# Patient Record
Sex: MALE | Race: WHITE | NOT HISPANIC OR LATINO | Employment: OTHER | URBAN - METROPOLITAN AREA
[De-identification: names, ages, dates, MRNs, and addresses within clinical notes are randomized per-mention and may not be internally consistent; named-entity substitution may affect disease eponyms.]

---

## 2024-04-23 ENCOUNTER — MEDICAL CORRESPONDENCE (OUTPATIENT)
Dept: HEALTH INFORMATION MANAGEMENT | Facility: CLINIC | Age: 79
End: 2024-04-23
Payer: MEDICARE

## 2024-04-25 ENCOUNTER — TRANSCRIBE ORDERS (OUTPATIENT)
Dept: OTHER | Age: 79
End: 2024-04-25

## 2024-04-25 DIAGNOSIS — G62.9 POLYNEUROPATHY: ICD-10-CM

## 2024-04-25 DIAGNOSIS — R29.898 BILATERAL ARM WEAKNESS: Primary | ICD-10-CM

## 2024-04-29 ENCOUNTER — TELEPHONE (OUTPATIENT)
Dept: NEUROLOGY | Facility: CLINIC | Age: 79
End: 2024-04-29
Payer: MEDICARE

## 2024-04-29 NOTE — TELEPHONE ENCOUNTER
Left Voicemail (1st Attempt) for the patient to call back and schedule the following:    Appointment type: New Neuromuscular  Provider: Dr. Harley, Dr. Evangelista, Dr. Solano  Return date: next avail, any location  Specialty phone number: [phone]  Additional appointment(s) needed:   Additonal Notes:

## 2024-06-12 ENCOUNTER — OFFICE VISIT (OUTPATIENT)
Dept: NEUROLOGY | Facility: CLINIC | Age: 79
End: 2024-06-12
Attending: FAMILY MEDICINE
Payer: MEDICARE

## 2024-06-12 VITALS — DIASTOLIC BLOOD PRESSURE: 81 MMHG | HEART RATE: 79 BPM | OXYGEN SATURATION: 94 % | SYSTOLIC BLOOD PRESSURE: 140 MMHG

## 2024-06-12 DIAGNOSIS — R29.898 BILATERAL ARM WEAKNESS: ICD-10-CM

## 2024-06-12 DIAGNOSIS — G62.9 POLYNEUROPATHY: ICD-10-CM

## 2024-06-12 DIAGNOSIS — M54.12 CERVICAL RADICULOPATHY: ICD-10-CM

## 2024-06-12 DIAGNOSIS — Z11.4 ENCOUNTER FOR SCREENING FOR HUMAN IMMUNODEFICIENCY VIRUS (HIV): ICD-10-CM

## 2024-06-12 DIAGNOSIS — E11.42 DIABETIC POLYNEUROPATHY ASSOCIATED WITH TYPE 2 DIABETES MELLITUS (H): ICD-10-CM

## 2024-06-12 DIAGNOSIS — R74.8 ABNORMAL LEVELS OF OTHER SERUM ENZYMES: ICD-10-CM

## 2024-06-12 DIAGNOSIS — R25.3 FASCICULATIONS: ICD-10-CM

## 2024-06-12 DIAGNOSIS — M47.812 CERVICAL SPONDYLOSIS WITHOUT MYELOPATHY: Primary | ICD-10-CM

## 2024-06-12 DIAGNOSIS — R63.39 OTHER FEEDING DIFFICULTIES: ICD-10-CM

## 2024-06-12 PROBLEM — L82.1 SK (SEBORRHEIC KERATOSIS): Status: ACTIVE | Noted: 2017-06-26

## 2024-06-12 PROBLEM — N40.1 BENIGN PROSTATIC HYPERPLASIA WITH INCOMPLETE BLADDER EMPTYING: Status: ACTIVE | Noted: 2020-01-21

## 2024-06-12 PROBLEM — F41.1 ANXIETY STATE: Status: ACTIVE | Noted: 2024-06-12

## 2024-06-12 PROBLEM — H25.9 AGE-RELATED CATARACT OF LEFT EYE: Status: ACTIVE | Noted: 2019-03-05

## 2024-06-12 PROBLEM — Z78.9 STATIN INTOLERANCE: Status: ACTIVE | Noted: 2020-01-21

## 2024-06-12 PROBLEM — N52.9 ED (ERECTILE DYSFUNCTION): Status: ACTIVE | Noted: 2024-06-12

## 2024-06-12 PROBLEM — M48.062 NEUROGENIC CLAUDICATION DUE TO LUMBAR SPINAL STENOSIS: Status: ACTIVE | Noted: 2019-06-17

## 2024-06-12 PROBLEM — M47.26 OSTEOARTHRITIS OF SPINE WITH RADICULOPATHY, LUMBAR REGION: Status: ACTIVE | Noted: 2019-05-15

## 2024-06-12 PROBLEM — N20.0 KIDNEY STONE ON LEFT SIDE: Status: ACTIVE | Noted: 2019-03-05

## 2024-06-12 PROBLEM — N18.32 STAGE 3B CHRONIC KIDNEY DISEASE (H): Status: ACTIVE | Noted: 2019-05-15

## 2024-06-12 PROBLEM — R39.14 BENIGN PROSTATIC HYPERPLASIA WITH INCOMPLETE BLADDER EMPTYING: Status: ACTIVE | Noted: 2020-01-21

## 2024-06-12 PROBLEM — M47.816 SPONDYLOSIS OF LUMBAR REGION WITHOUT MYELOPATHY OR RADICULOPATHY: Status: ACTIVE | Noted: 2019-03-05

## 2024-06-12 LAB
HIV 1+2 AB+HIV1 P24 AG SERPL QL IA: NONREACTIVE
PTH-INTACT SERPL-MCNC: 21 PG/ML (ref 15–65)

## 2024-06-12 PROCEDURE — 84155 ASSAY OF PROTEIN SERUM: CPT | Performed by: STUDENT IN AN ORGANIZED HEALTH CARE EDUCATION/TRAINING PROGRAM

## 2024-06-12 PROCEDURE — 36415 COLL VENOUS BLD VENIPUNCTURE: CPT | Performed by: STUDENT IN AN ORGANIZED HEALTH CARE EDUCATION/TRAINING PROGRAM

## 2024-06-12 PROCEDURE — 84630 ASSAY OF ZINC: CPT | Mod: 90 | Performed by: STUDENT IN AN ORGANIZED HEALTH CARE EDUCATION/TRAINING PROGRAM

## 2024-06-12 PROCEDURE — 86334 IMMUNOFIX E-PHORESIS SERUM: CPT | Performed by: PATHOLOGY

## 2024-06-12 PROCEDURE — 99417 PROLNG OP E/M EACH 15 MIN: CPT | Performed by: STUDENT IN AN ORGANIZED HEALTH CARE EDUCATION/TRAINING PROGRAM

## 2024-06-12 PROCEDURE — 82525 ASSAY OF COPPER: CPT | Mod: 90 | Performed by: STUDENT IN AN ORGANIZED HEALTH CARE EDUCATION/TRAINING PROGRAM

## 2024-06-12 PROCEDURE — 86038 ANTINUCLEAR ANTIBODIES: CPT | Performed by: STUDENT IN AN ORGANIZED HEALTH CARE EDUCATION/TRAINING PROGRAM

## 2024-06-12 PROCEDURE — 84165 PROTEIN E-PHORESIS SERUM: CPT | Performed by: PATHOLOGY

## 2024-06-12 PROCEDURE — 99205 OFFICE O/P NEW HI 60 MIN: CPT | Performed by: STUDENT IN AN ORGANIZED HEALTH CARE EDUCATION/TRAINING PROGRAM

## 2024-06-12 PROCEDURE — 82390 ASSAY OF CERULOPLASMIN: CPT | Performed by: STUDENT IN AN ORGANIZED HEALTH CARE EDUCATION/TRAINING PROGRAM

## 2024-06-12 PROCEDURE — 99207 HIV ANTIGEN ANTIBODY COMBO: CPT | Performed by: STUDENT IN AN ORGANIZED HEALTH CARE EDUCATION/TRAINING PROGRAM

## 2024-06-12 PROCEDURE — 84443 ASSAY THYROID STIM HORMONE: CPT | Performed by: STUDENT IN AN ORGANIZED HEALTH CARE EDUCATION/TRAINING PROGRAM

## 2024-06-12 PROCEDURE — 83970 ASSAY OF PARATHORMONE: CPT | Performed by: STUDENT IN AN ORGANIZED HEALTH CARE EDUCATION/TRAINING PROGRAM

## 2024-06-12 RX ORDER — TAMSULOSIN HYDROCHLORIDE 0.4 MG/1
2 CAPSULE ORAL AT BEDTIME
COMMUNITY
Start: 2023-07-26 | End: 2024-07-25

## 2024-06-12 RX ORDER — EPINEPHRINE 0.3 MG/.3ML
0.3 INJECTION SUBCUTANEOUS PRN
COMMUNITY
Start: 2023-08-09

## 2024-06-12 RX ORDER — BLOOD SUGAR DIAGNOSTIC
STRIP MISCELLANEOUS DAILY
COMMUNITY
Start: 2024-04-23 | End: 2025-04-23

## 2024-06-12 RX ORDER — ASPIRIN 81 MG/1
81 TABLET ORAL DAILY
COMMUNITY
Start: 2023-07-26 | End: 2024-07-25

## 2024-06-12 RX ORDER — GEMFIBROZIL 600 MG/1
600 TABLET, FILM COATED ORAL 2 TIMES DAILY
COMMUNITY
Start: 2023-07-26

## 2024-06-12 RX ORDER — MAGNESIUM OXIDE 400 MG/1
400 TABLET ORAL PRN
COMMUNITY
Start: 2024-02-19 | End: 2025-02-18

## 2024-06-12 RX ORDER — BISACODYL 5 MG/1
5 TABLET, DELAYED RELEASE ORAL DAILY
COMMUNITY
Start: 2024-03-12

## 2024-06-12 RX ORDER — ATENOLOL 25 MG/1
25 TABLET ORAL DAILY
COMMUNITY
Start: 2023-07-26

## 2024-06-12 RX ORDER — TRIAMCINOLONE ACETONIDE 1 MG/G
CREAM TOPICAL PRN
COMMUNITY
Start: 2024-02-21

## 2024-06-12 RX ORDER — AMLODIPINE BESYLATE 5 MG/1
5 TABLET ORAL EVERY MORNING
COMMUNITY

## 2024-06-12 RX ORDER — LISINOPRIL 20 MG/1
20 TABLET ORAL DAILY
COMMUNITY

## 2024-06-12 RX ORDER — GLIPIZIDE 10 MG/1
10 TABLET, FILM COATED, EXTENDED RELEASE ORAL DAILY
COMMUNITY

## 2024-06-12 NOTE — PROGRESS NOTES
CHIEF COMPLAINT / REASON FOR VISIT  Bilateral arm weakness    Referred by Dr. Garcia    HISTORY OF PRESENT ILLNESS   is a 79 year old male presenting to Neuromuscular Clinic for evaluation of bilateral arm weakness.  Patient was evaluated by neurologists at [facility] and referred to this clinic for second opinion.  He was seen by Dr. Lynn last week. Her note is not available for review today.     His medical history significant for   - diabetes mellitus, diagnosed 12 to 15 years ago, relatively well-controlled, hemoglobin A1c around 6 to 8%.  -Chronic low back pain s/p lumbar fusion surgery 4 years ago.  The surgery helped with the back pain as well as balance.    He reports numbness and tingling sensation in the feet that started about 2 years ago and have been stable.  This was attributed to diabetic neuropathy.  About 2 to 3 years ago, he started to notice weakness in proximal arms.  Symptoms started in both arms around the same time but left slightly worse than right.  He denies any associating pain, neck pain, or radiating pain.  He denies persistent numbness in the arms but reports intermittent tingling in the fingertips that has been more noticeable in the past 2 months.  He has also noticed muscle twitching in the proximal arms and sometimes in the hands.  The weakness has gradually progressed over the years to the point that it is difficult for him to raise arms above head.  He also has difficulties opening a bottle of water.  He feels slight weakness/heaviness in the legs.  He continues to be able to walk without gait aid.  No difficulties with walking up and down steps.  No bowel and bladder control issues.  No swallowing difficulties, change in his voice, double vision.  His weight has been relatively stable around 190 to 200 pounds.    He was also seen by orthopedic surgeon for rotator cuff arthropathy of the left shoulder.  Physical therapy, corticosteroid injection, and eventual surgery were  discussed but the patient was not interested in any type of injections.  He is interested in total shoulder replacement on the left but would like to complete neurological evaluation first.    He underwent CT myelogram in March 2024 which showed advanced spondylosis with moderate to severe canal stenosis at C3-C4 and C4-C5.  He was evaluated by neurosurgeon, Dr. Preston at [facility].  The patient is supposed to contact Dr. Preston back after EMG. 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     His dad's first cousin has ALS.  Paternal grandfather was diagnosed with dementia thought to be vascular dementia in his 70-80s.  No family history of myopathy.    I have reviewed prior investigations as listed below:  - EMG/NCS by Dr. Saray Rosado from [facility] 4/16/2024 (Tabulated data from EMG report were personally reviewed and independently interpreted):  Bilateral median and ulnar SNAPs were absent. Left radial SNAP amplitude was 9uV with mild slowing of CV. Right median distal motor latency was prolonged with preserved amplitude. Bilateral ulnar CMAP amplitudes were reduced, right worse than left; no clear conduction block or focal slowing across the elbow. Needle EMG reportedly showed fibs or fasciculations with polyphasic MUPs in left FDI, APB, pronator teres, brachialis, and triceps. Reduced recruitment of polyphasic MUPs noted in left FDI.   - Vitamin B12 759  -   - HbA1c 7.8%  - SILVIA negative in 2023  - TSH normal in 2023  - CT myelogram cervical spine 3/28/2024:   1. Advanced spondylosis resulting in canal stenosis that is most advanced and   moderate-severe at C3-4 and C4-5 with multilevel lateral recess partial   effacement, as a possible etiology for pain and/or radiculopathy.   2. Multilevel higher grade foraminal stenoses, as a possible etiology for pain   and/or radiculopathy.   3. Cervical cord mild atrophy at C4-5 level    REVIEW OF SYSTEMS  All negative except for what indicated in  the [facility]. The following portions of the patient's history were reviewed and updated as appropriate: allergies, current medications, family history, medical history, surgical history, social history, and problem list.     PAST MEDICAL/SURGICAL HISTORY   As listed in HPI    PHYSICAL EXAM    NEUROLOGICAL EXAMINATION  Mental status: normal.  Gait: normal.  Walk on heels: yes, bilaterally.  Walk on toes: yes, bilaterally.    Getting up from seated position without pushing on chair: yes.  Posture: normal.  Coordination: normal.  Romberg: negative.  Speech: normal.  Extrapyramidal: normal.    Cranial nerves   R Muscle strength L  R  L   5 Frontalis 5       5 Orbicularis oculi 5  3 mm Pupils 3 mm   5 Lower facial muscles 5  nl Light reflex nl   5 Temporal-Masseter 5   Ptosis    5 Palate-Pharynx 5  nl Extraocular muscles nl   5 Sternocleidomastoid 5       5 Trapezius 5  nl Hearing nl   5 Genioglossus 5       Muscle atrophy/ enlargement: no  Fasciculations: no tongue fasciculations         R Muscle, strength L  R Muscle, strength L    Neck     Trunk    5 Neck flexors 5   Abdominal muscles    5 Neck extensors 5   Paraspinals     Upper Limbs    Lower Limbs    5 Supraspinatus 4  5 Iliopsoas 5   3 Infraspinatus 3  5 Adductors, thigh 5   4 Pectoralis 3  5 Gluteus medius 5   5 Deltoid 3  5 Gluteus max 5   4 Biceps brachii 3  5 Quadriceps 5   4+ Brachioradialis 3  5 Hamstrings 5   5/3 Supinator/pronator 4/5  5 Tibialis anterior 5   3 Triceps 4  5 Peronei 5   5 Wrist extensor 5  4 EHL 5   5 Wrist flexors 5  5 Toe extensors 5   5 Digit extensors 4  5 Tibialis post. 5   5 Digit flexors 5  5 Toe flexors 5   4 Thenar 4  5 Calf muscles 5   5 Hypothenar 5       5 Interossei 5       Muscle atrophy / enlargement: moderate-severe muscle atrophy of the proximal upper limbs in proportion to degree of weakness  Fasciculations: Diffuse fasciculations in bilateral deltoids, triceps, biceps, and pectoralis muscles. No fasciculations in the lower  limbs     R Reflexes L   1 Biceps brachii 0   0 Brachioradialis 0   1 Triceps 1   no Peralta no   1 Quadriceps 1   0 Gastroc-soleus 0   no Clonus (ankle) no   flexion Plantar response flexion        High arched palate: Absent. Elongated face: Absent. Scapular winging: Left lateral winging Pes cavus: Absent. Hammertoes: Absent. Contractures: Absent. Joint hypermobility: Absent.    Sensation  Face: normal.  Upper limbs: normal for all modalities.  Lower limbs: Moderately reduced sensation to vibraton in the toes and mildly reduced pinprick sensation in the feet up to ankles.     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 Lambert is a 79 year old male presenting with painless asymmetric bilateral upper limb weakness that started 2 to 3 years ago and slowly progressed. Exam showed moderate weakness in proximal>distal upper limbs predominantly involving C6-7 distributions on the left and C5-6 on the right. There was also significant atrophy and fasciculations. Sensation seems to be preserved in the upper limbs. The distribution of muscle weakness, atrophy, and fasciculations seem to correlate with the levels of significant high-grade neural foraminal narrowing on CT myelogram. There was also mild atrophy of cervical cord at C4-5 level noted, which could suggest a component of myelomalacia from degenerative spine disease contributing to arm weakness. That being said, a motor neuron disorder presenting as brachial  amyotrophic diplegia could not be entirely excluded.  However, this is less likely given his motor deficit seems to be limited to the cervical segment after 2-3 years from symptom onset. There was no clear UMN signs on exam today. We will obtain a repeat EMG to look for evidence of diffuse motor neuron disorder.     Lastly, exam also showed evidence of a mild length-dependent sensory predominant peripheral neuropathy, which is likely related to chronic diabetes.     After discussion with Mr. Lambert, we have agreed to proceed with the following investigations and management:    Recommendations:  - EMG/NCS. Please evaluate for cervical radiculopathy vs diffuse motor neuron disorder. Please include needle EMG of bilateral upper limb, one lower limb, thoracic paraspinal, and facial muscles.   - Labs for HIV, copper, zinc, ceruloplasmin, SPEP/JUAN R, PTH, TSH, SILVIA  -He asked a question regarding cervical spine surgery. I do not think that his arm weakness will significantly improve with cervical spine surgery as there is already atrophy of several muscles. He currently does not have neck pain or radiating pain. If repeat EMG shows evidence of active denervation, will ask him to re-discuss with neurosurgeon to see if surgical intervention is needed to prevent progression.   - I believe the most important treatment for him at this time is physical therapy. I placed the referral. He can also pursue this closer to home.   - F/U in 3 months.     I spent a total of 90 minutes on the day of the visit for chart review, face-to-face visit, counseling/coordination of care, and documentation. Please see the note for further information on patient assessment and treatment.       PATIENT EDUCATION  Ready to learn, no apparent learning barriers were identified; learning preferences include listening.  Explained diagnosis and treatment plan; patient expressed understanding of the content.

## 2024-06-12 NOTE — PATIENT INSTRUCTIONS
- Labs today  - EMG to be scheduled  - You will benefit from physical therapy for strengthening exercise and ROM of the upper limbs to maintain muscle strength. This can also be done locally closer to home.   - F/U in 3 months

## 2024-06-13 LAB
ALBUMIN SERPL ELPH-MCNC: 4.7 G/DL (ref 3.7–5.1)
ALPHA1 GLOB SERPL ELPH-MCNC: 0.2 G/DL (ref 0.2–0.4)
ALPHA2 GLOB SERPL ELPH-MCNC: 0.8 G/DL (ref 0.5–0.9)
ANA SER QL IF: NEGATIVE
B-GLOBULIN SERPL ELPH-MCNC: 0.8 G/DL (ref 0.6–1)
CERULOPLASMIN SERPL-MCNC: 15 MG/DL (ref 20–60)
GAMMA GLOB SERPL ELPH-MCNC: 0.8 G/DL (ref 0.7–1.6)
M PROTEIN SERPL ELPH-MCNC: 0 G/DL
PROT PATTERN SERPL ELPH-IMP: NORMAL
PROT PATTERN SERPL IFE-IMP: NORMAL
TOTAL PROTEIN SERUM FOR ELP: 7.3 G/DL (ref 6.4–8.3)
TSH SERPL DL<=0.005 MIU/L-ACNC: 0.56 UIU/ML (ref 0.3–4.2)

## 2024-06-15 LAB
COPPER SERPL-MCNC: 98.6 UG/DL
ZINC SERPL-MCNC: 80.2 UG/DL

## 2024-07-01 ENCOUNTER — TELEPHONE (OUTPATIENT)
Dept: NEUROLOGY | Facility: CLINIC | Age: 79
End: 2024-07-01
Payer: MEDICARE

## 2024-09-17 ENCOUNTER — TELEPHONE (OUTPATIENT)
Dept: NEUROSURGERY | Facility: CLINIC | Age: 79
End: 2024-09-17

## 2024-09-17 ENCOUNTER — OFFICE VISIT (OUTPATIENT)
Dept: NEUROLOGY | Facility: CLINIC | Age: 79
End: 2024-09-17
Payer: MEDICARE

## 2024-09-17 ENCOUNTER — DOCUMENTATION ONLY (OUTPATIENT)
Dept: NEUROLOGY | Facility: CLINIC | Age: 79
End: 2024-09-17

## 2024-09-17 DIAGNOSIS — M47.812 CERVICAL SPONDYLOSIS WITHOUT MYELOPATHY: ICD-10-CM

## 2024-09-17 DIAGNOSIS — R25.3 FASCICULATIONS: ICD-10-CM

## 2024-09-17 DIAGNOSIS — E11.42 DIABETIC POLYNEUROPATHY ASSOCIATED WITH TYPE 2 DIABETES MELLITUS (H): ICD-10-CM

## 2024-09-17 DIAGNOSIS — M54.12 CERVICAL RADICULOPATHY: Primary | ICD-10-CM

## 2024-09-17 DIAGNOSIS — R29.898 BILATERAL ARM WEAKNESS: Primary | ICD-10-CM

## 2024-09-17 DIAGNOSIS — M54.12 CERVICAL RADICULOPATHY: ICD-10-CM

## 2024-09-17 DIAGNOSIS — R29.898 BILATERAL ARM WEAKNESS: ICD-10-CM

## 2024-09-17 PROCEDURE — 95887 MUSC TST DONE W/N TST NONEXT: CPT | Performed by: STUDENT IN AN ORGANIZED HEALTH CARE EDUCATION/TRAINING PROGRAM

## 2024-09-17 PROCEDURE — 95886 MUSC TEST DONE W/N TEST COMP: CPT | Performed by: STUDENT IN AN ORGANIZED HEALTH CARE EDUCATION/TRAINING PROGRAM

## 2024-09-17 PROCEDURE — 95913 NRV CNDJ TEST 13/> STUDIES: CPT | Performed by: STUDENT IN AN ORGANIZED HEALTH CARE EDUCATION/TRAINING PROGRAM

## 2024-09-17 NOTE — TELEPHONE ENCOUNTER
Records Requested     September 17, 2024 4:02 PM   [id number]   Facility  [facility]   Outcome 4:04 pm Sent request for imaging to be pushed to PACS. -CONRAD Del Cidlouise Murillo on 10/1/2024 at 8:05 AM Imaging resolved into PACS. -CONRAD     SPINE PATIENTS - NEW PROTOCOL PREVISIT    RECORDS RECEIVED FROM: Care Everywhere   REASON FOR VISIT: Bilateral arm weakness/ Fasciculations/ Cervical spondylosis without myelopathy/ Cervical radiculopathy   PROVIDER: Marii Lema PA-C   DATE OF APPT: 10/10/24 @ 10:00 am    NOTES (FOR ALL VISITS) STATUS DETAILS   OFFICE NOTE from referring provider Internal 9/17/24, 6/12/24 Autumn Solano MD @[facility]-Neuro     OFFICE NOTE from other specialist Care Everywhere 6/4/24 Neil Lynn MD  @[facility] Neurology    4/23/24 Randy Garcia MD  @[facility]     4/3/24 Jimenez Preston MD  @[facility] Neurosurgery     DISCHARGE SUMMARY from hospital Care Everywhere 6/24/19-6/27/19 Charles Parikh DO  @[facility]     OPERATIVE REPORT Care Everywhere 6/24/19 Charles Parikh DO  @[facility] L1 to S1 Laminectomies, Medial Facetectomies, L1-S1 Posterolateral Fusion, O-Arm      EMG REPORT Internal [facility]  9/17/24 EMG     MEDICATION LIST Internal    IMAGING  (FOR ALL VISITS)     XRAY (SPINE) *NEUROSURGERY* PACS [facility]  3/19/24 XR Cervical Spine  5/14/18 XR Cervical Spine     CT (HEAD, NECK, SPINE) PACS [facility]  3/28/24 CT Myelogram Cervical  5/29/18 CT Myelogram Cervical

## 2024-09-17 NOTE — PROGRESS NOTES
[facility]  Electrodiagnostic Laboratory                 Department of Neurology                                                                                                         Test Date:  2024    Patient: Tl Lambert : 1945 Physician: Autumn Solano MD   Sex: Male AGE: 79 year Ref Phys: Autumn Solano MD   ID#: [id number]   Technician: Cosme De La Cruz     History and Examination:  79-year-old man presenting with painless asymmetric bilateral upper limb weakness that started 2 to 3 years ago and slowly progressed. Exam showed moderate weakness in proximal>distal upper limbs predominantly involving C6-7 distributions on the left and C5-6 on the right. There was also significant atrophy and fasciculations.  Of note, the patient has diabetes mellitus and numbness in both feet as well as both hands.  This study was performed to assess for cervical radiculopathy versus motor neuron disorder.    Techniques:  Motor and sensory conduction studies were done with surface recording electrodes.  Temperature was monitored and recorded throughout the study. Upper extremities were maintained at a temperature of 32 degrees Centigrade or higher.  EMG was done with a concentric needle electrode.     Results:  Nerve conduction studies showed absent all markedly reduced amplitude of all sensory nerves of bilateral upper and lower limbs.  Bilateral tibial and fibular (EDB) compound muscle action potentials were absent.  Right median compound muscle action potential amplitude was mildly reduced with slowing of conduction velocity.  Right ulnar compound muscle action potential amplitude was moderately reduced with focal slowing of conduction velocity across the elbow.  Right ulnar F-wave latency was prolonged.    Needle EMG of the right lower limb showed mildly reduced recruitment of long-duration and high amplitude motor unit potentials in the tibialis posterior muscle.   There were no fibrillation potentials or fasciculations.    Needle EMG of bilateral upper limbs showed moderate to severely reduced recruitment of long-duration, high amplitude, and sometimes polyphasic motor unit potentials in several proximal and distal muscles.  Fibrillation potentials were noted in bilateral C7 and left C5-C6 innervated muscles.  Occasional fasciculations were noted in bilateral FDI, right FCU, and left tricep muscles.    Needle EMG of thoracic and cervical paraspinal muscles were normal.    Interpretation:  This is an abnormal study.  The electrophysiologic findings are most consistent with a chronic neurogenic process involving bilateral C5-T1 distributions.  These findings could be seen in multilevel cervical radiculopathies but motor neuron disorder of the cervical segment could not be entirely excluded.  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     Autumn Solano MD  Department of Neurology        Nerve Conduction Studies  Motor Sites      Latency Neg. Amp Neg. Amp Diff Segment Distance Velocity Neg. Dur Neg Area Diff Temperature Comment   Site (ms) Norm (mV) Norm (%)  cm m/s Norm (ms) (%) ( C)    Left Dp Branch Fibular (TA) Motor   Fibular Head 4.4  < 6.0 2.8 -      7.1  31.5    Right Dp Branch Fibular (TA) Motor   Fibular Head 4.2  < 6.0 2.9 -      7.7  31.2    Pop Fossa 6.4  < 5.7 2.7 - -7 Pop Fossa-Fibular Head 10.5 48 - 7.5 -9 31.2    Left Fibular (EDB) Motor   Ankle NR  < 6.0 NR -  Ankle-EDB 8   NR  31.5    Right Fibular (EDB) Motor   Ankle NR  < 6.0 NR -  Ankle-EDB 8   NR  31    Bel Fibular Head NR - NR - NR Bel Fibular Head-Ankle - NR  > 38 NR NR 31.1    Left Median (APB) Motor   Wrist 4.1  < 4.4 5.8  > 5.0  Wrist-APB 8   4.3  33.9    Elbow 9.0 - 5.5  > 5.0 -5 Elbow-Wrist 24.5 50  > 48 4.5 -11 33.9    Right Median (APB) Motor   Wrist 4.1  < 4.4 5.3  > 5.0  Wrist-APB 8   4.0  33    Elbow 9.4 - 4.3   > 5.0 -19 Elbow-Wrist 24.5 46  > 48 4.2 -19 33    Left Tibial (AHB) Motor   Ankle NR  < 6.5 NR  > 5.0  Ankle-AH 8   NR  31.4    Right Tibial (AHB) Motor   Ankle NR  < 6.5 NR  > 5.0  Ankle-AH 8   NR  31.4    Left Ulnar (ADM) Motor   Wrist 3.3  < 3.5 5.9  > 5.0  Wrist-ADM 8   4.6  32.8    Below Elbow 8.1 - 5.0 - -15 Below Elbow-Wrist 23.5 49  > 48 4.9 -8 32.8    Above Elbow 11.1 - 4.7 - -6 Above Elbow-Below Elbow 9.5 32  > 48 4.9 6 32.7    Right Ulnar (ADM) Motor   Wrist 3.1  < 3.5 3.3  > 5.0  Wrist-ADM 7.5   5.4  33.2    Below Elbow 7.6 - 2.5 - -24 Below Elbow-Wrist 22.5 50  > 48 5.9 -28 33.2    Above Elbow 10.2 - 1.88 - -25 Above Elbow-Below Elbow 10 38  > 48 6.4 -2 33.1      F-Wave Sites      Min F-Lat Max-Min F-Lat Mean F-Lat   Site (ms) (ms) (ms)   Right Ulnar F-Wave   Wrist 41.7 4.9 44.1     Sensory Sites      Onset Lat Peak Lat Amp (O-P) Amp (P-P) Segment Distance Velocity Temperature Comment   Site ms (ms)  V Norm ( V)  cm m/s Norm ( C)    Left Median Sensory   Wrist-Dig II 2.9 3.8 7  > 10 9 Wrist-Dig II 14 48  > 48 34.1    Right Median Sensory   Wrist-Dig II 2.8 3.7 5  > 10 10 Wrist-Dig II 14 50  > 48 33.1    Left Radial Sensory   Forearm-Wrist 2.1 2.7 4  > 15 6 Forearm-Wrist 10 48 - 33.6    Right Radial Sensory   Forearm-Wrist 1.98 2.4 4  > 15 8 Forearm-Wrist 10 51 - 33.4    Left Sural Sensory   Calf-Lat Malleolus NR NR NR  > 5 NR Calf-Lat Malleolus 14 NR  > 38 31.5    Right Sural Sensory   Calf-Lat Malleolus NR NR NR  > 5 NR Calf-Lat Malleolus 14 NR  > 38 31.4    Left Ulnar Sensory   Wrist-Dig V NR NR NR  > 8 NR Wrist-Dig V 12.5 NR  > 48 34    Right Ulnar Sensory   Wrist-Dig V NR NR NR  > 8 NR Wrist-Dig V 12.5 NR  > 48 33.1        Electromyography     Side Muscle Ins Act Fibs/PSW Fasc HF Amp Dur Poly Recrt Int Pat   Right Tib ant Nml None Nml 0 Nml Nml 0 Nml Nml   Right Gastroc MH Nml None Nml 0 Nml Nml 0 Nml Nml   Right Tib post Nml None Nml 0 1+ 1+ 0 Marco A Nml   Right Vastus med Nml None Nml 0 Nml Nml 0  Nml Nml   Right T10 PSP Nml None Nml 0 Nml Nml 0 Nml Nml   Right Gluteus med Nml None Nml 0 Nml Nml 0 Nml Nml   Right T7 PSP Nml None Nml 0 Nml Nml 0 Nml Nml   Right FDI Nml None 1+ 0 Nml 1+ 0 ModRed Nml   Right Pronator Teres Nml 3+ Nml 0 Nml 1+ 1+ SevRed Nml   Right Triceps Nml 2+ Nml 0 Nml 1+ 1+ SevRed Nml   Right Biceps Nml None Nml 0 1+ 2+ 1+ ModRed Nml   Right Deltoid Nml None Nml 0 1+ 2+ 1+ ModRed Nml   Right FCU Nml None 1+ 0 1+ 1+ 0 ModRed Nml   Left FDI Nml None 1+ 0 1+ 1+ 0 ModRed Nml   Left Pronator Teres Nml 3+ Nml 0 Nml 1+ 0 SevRed Nml   Left Triceps Nml 1+ 1+ 0 1+ 1+ 0 SevRed Nml   Left Biceps Nml 2+ Nml 0 1+ 1+ 0 ModRed Nml   Left Deltoid Nml None Nml 0 1+ 1+ 1+ SevRed Nml   Left Cervical PSP Nml None Nml 0 Nml Nml 0 Nml Nml     NCS Waveforms:    Motor                                  Sensory                           F-Wave

## 2024-09-17 NOTE — PROGRESS NOTES
PROGRESS NOTE    EMG today: The electrophysiologic findings are most consistent with a chronic neurogenic process involving bilateral C5-T1 distributions.  These findings could be seen in multilevel cervical radiculopathies but motor neuron disorder of the cervical segment could not be entirely excluded.  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  In the past month, he has been experiencing more sharp pain and tingling sensation in bilateral hands and forearm, right worse than left.  At this point, I would like to get an opinion from neurosurgeon/spine clinic for evaluation of advanced cervical spondylosis. My questions are 1) does the degree of canal stenosis and foraminal narrowing explain the weakness and sensory symptoms in the arm? and 2) is surgical intervention needed to prevent progression?    If the surgeon feels that his motor deficit could not be entirely explained by advanced cervical spondylosis, I suspect we may be dealing with brachial artery atrophic diplegia (which is a form of degenerative motor neuron disorder).     I would like to see him back after an evaluation with spine clinic.  This can be virtual.

## 2024-09-17 NOTE — LETTER
2024       RE: Tl Lambert  [address]  [facility] [address]     Dear Colleague,    Thank you for referring your patient, Tl Lambert, to the [facility] EMG CLIN[facility] [place] at [facility]. Please see a copy of my visit note below.                        [facility]  Electrodiagnostic Laboratory                 Department of Neurology                                                                                                         Test Date:  2024    Patient: Tl Lambert : 1945 Physician: Autumn Solano MD   Sex: Male AGE: 79 year Ref Phys: Autumn Solano MD   ID#: [id number]   Technician: Cosme De La Cruz     History and Examination:  79-year-old man presenting with painless asymmetric bilateral upper limb weakness that started 2 to 3 years ago and slowly progressed. Exam showed moderate weakness in proximal>distal upper limbs predominantly involving C6-7 distributions on the left and C5-6 on the right. There was also significant atrophy and fasciculations.  Of note, the patient has diabetes mellitus and numbness in both feet as well as both hands.  This study was performed to assess for cervical radiculopathy versus motor neuron disorder.    Techniques:  Motor and sensory conduction studies were done with surface recording electrodes.  Temperature was monitored and recorded throughout the study. Upper extremities were maintained at a temperature of 32 degrees Centigrade or higher.  EMG was done with a concentric needle electrode.     Results:  Nerve conduction studies showed absent all markedly reduced amplitude of all sensory nerves of bilateral upper and lower limbs.  Bilateral tibial and fibular (EDB) compound muscle action potentials were absent.  Right median compound muscle action potential amplitude was mildly reduced with slowing of conduction velocity.  Right ulnar compound muscle action potential amplitude  was moderately reduced with focal slowing of conduction velocity across the elbow.  Right ulnar F-wave latency was prolonged.    Needle EMG of the right lower limb showed mildly reduced recruitment of long-duration and high amplitude motor unit potentials in the tibialis posterior muscle.  There were no fibrillation potentials or fasciculations.    Needle EMG of bilateral upper limbs showed moderate to severely reduced recruitment of long-duration, high amplitude, and sometimes polyphasic motor unit potentials in several proximal and distal muscles.  Fibrillation potentials were noted in bilateral C7 and left C5-C6 innervated muscles.  Occasional fasciculations were noted in bilateral FDI, right FCU, and left tricep muscles.    Needle EMG of thoracic and cervical paraspinal muscles were normal.    Interpretation:  This is an abnormal study.  The electrophysiologic findings are most consistent with a chronic neurogenic process involving bilateral C5-T1 distributions.  These findings could be seen in multilevel cervical radiculopathies but motor neuron disorder of the cervical segment could not be entirely excluded.  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     Autumn Solano MD  Department of Neurology        Nerve Conduction Studies  Motor Sites      Latency Neg. Amp Neg. Amp Diff Segment Distance Velocity Neg. Dur Neg Area Diff Temperature Comment   Site (ms) Norm (mV) Norm (%)  cm m/s Norm (ms) (%) ( C)    Left Dp Branch Fibular (TA) Motor   Fibular Head 4.4  < 6.0 2.8 -      7.1  31.5    Right Dp Branch Fibular (TA) Motor   Fibular Head 4.2  < 6.0 2.9 -      7.7  31.2    Pop Fossa 6.4  < 5.7 2.7 - -7 Pop Fossa-Fibular Head 10.5 48 - 7.5 -9 31.2    Left Fibular (EDB) Motor   Ankle NR  < 6.0 NR -  Ankle-EDB 8   NR  31.5    Right Fibular (EDB) Motor   Ankle NR  < 6.0 NR -  Ankle-EDB 8   NR  31    Bel Fibular Head NR - NR - NR Bel  Fibular Head-Ankle - NR  > 38 NR NR 31.1    Left Median (APB) Motor   Wrist 4.1  < 4.4 5.8  > 5.0  Wrist-APB 8   4.3  33.9    Elbow 9.0 - 5.5  > 5.0 -5 Elbow-Wrist 24.5 50  > 48 4.5 -11 33.9    Right Median (APB) Motor   Wrist 4.1  < 4.4 5.3  > 5.0  Wrist-APB 8   4.0  33    Elbow 9.4 - 4.3  > 5.0 -19 Elbow-Wrist 24.5 46  > 48 4.2 -19 33    Left Tibial (AHB) Motor   Ankle NR  < 6.5 NR  > 5.0  Ankle-AH 8   NR  31.4    Right Tibial (AHB) Motor   Ankle NR  < 6.5 NR  > 5.0  Ankle-AH 8   NR  31.4    Left Ulnar (ADM) Motor   Wrist 3.3  < 3.5 5.9  > 5.0  Wrist-ADM 8   4.6  32.8    Below Elbow 8.1 - 5.0 - -15 Below Elbow-Wrist 23.5 49  > 48 4.9 -8 32.8    Above Elbow 11.1 - 4.7 - -6 Above Elbow-Below Elbow 9.5 32  > 48 4.9 6 32.7    Right Ulnar (ADM) Motor   Wrist 3.1  < 3.5 3.3  > 5.0  Wrist-ADM 7.5   5.4  33.2    Below Elbow 7.6 - 2.5 - -24 Below Elbow-Wrist 22.5 50  > 48 5.9 -28 33.2    Above Elbow 10.2 - 1.88 - -25 Above Elbow-Below Elbow 10 38  > 48 6.4 -2 33.1      F-Wave Sites      Min F-Lat Max-Min F-Lat Mean F-Lat   Site (ms) (ms) (ms)   Right Ulnar F-Wave   Wrist 41.7 4.9 44.1     Sensory Sites      Onset Lat Peak Lat Amp (O-P) Amp (P-P) Segment Distance Velocity Temperature Comment   Site ms (ms)  V Norm ( V)  cm m/s Norm ( C)    Left Median Sensory   Wrist-Dig II 2.9 3.8 7  > 10 9 Wrist-Dig II 14 48  > 48 34.1    Right Median Sensory   Wrist-Dig II 2.8 3.7 5  > 10 10 Wrist-Dig II 14 50  > 48 33.1    Left Radial Sensory   Forearm-Wrist 2.1 2.7 4  > 15 6 Forearm-Wrist 10 48 - 33.6    Right Radial Sensory   Forearm-Wrist 1.98 2.4 4  > 15 8 Forearm-Wrist 10 51 - 33.4    Left Sural Sensory   Calf-Lat Malleolus NR NR NR  > 5 NR Calf-Lat Malleolus 14 NR  > 38 31.5    Right Sural Sensory   Calf-Lat Malleolus NR NR NR  > 5 NR Calf-Lat Malleolus 14 NR  > 38 31.4    Left Ulnar Sensory   Wrist-Dig V NR NR NR  > 8 NR Wrist-Dig V 12.5 NR  > 48 34    Right Ulnar Sensory   Wrist-Dig V NR NR NR  > 8 NR Wrist-Dig V 12.5 NR  > 48  33.1        Electromyography     Side Muscle Ins Act Fibs/PSW Fasc HF Amp Dur Poly Recrt Int Pat   Right Tib ant Nml None Nml 0 Nml Nml 0 Nml Nml   Right Gastroc MH Nml None Nml 0 Nml Nml 0 Nml Nml   Right Tib post Nml None Nml 0 1+ 1+ 0 Marco A Nml   Right Vastus med Nml None Nml 0 Nml Nml 0 Nml Nml   Right T10 PSP Nml None Nml 0 Nml Nml 0 Nml Nml   Right Gluteus med Nml None Nml 0 Nml Nml 0 Nml Nml   Right T7 PSP Nml None Nml 0 Nml Nml 0 Nml Nml   Right FDI Nml None 1+ 0 Nml 1+ 0 ModRed Nml   Right Pronator Teres Nml 3+ Nml 0 Nml 1+ 1+ SevRed Nml   Right Triceps Nml 2+ Nml 0 Nml 1+ 1+ SevRed Nml   Right Biceps Nml None Nml 0 1+ 2+ 1+ ModRed Nml   Right Deltoid Nml None Nml 0 1+ 2+ 1+ ModRed Nml   Right FCU Nml None 1+ 0 1+ 1+ 0 ModRed Nml   Left FDI Nml None 1+ 0 1+ 1+ 0 ModRed Nml   Left Pronator Teres Nml 3+ Nml 0 Nml 1+ 0 SevRed Nml   Left Triceps Nml 1+ 1+ 0 1+ 1+ 0 SevRed Nml   Left Biceps Nml 2+ Nml 0 1+ 1+ 0 ModRed Nml   Left Deltoid Nml None Nml 0 1+ 1+ 1+ SevRed Nml   Left Cervical PSP Nml None Nml 0 Nml Nml 0 Nml Nml     NCS Waveforms:    Motor                                  Sensory                           F-Wave                   Again, thank you for allowing me to participate in the care of your patient.      Sincerely,    Autumn Solano MD

## 2024-10-10 ENCOUNTER — PRE VISIT (OUTPATIENT)
Dept: NEUROSURGERY | Facility: CLINIC | Age: 79
End: 2024-10-10

## 2024-10-10 ENCOUNTER — OFFICE VISIT (OUTPATIENT)
Dept: NEUROSURGERY | Facility: CLINIC | Age: 79
End: 2024-10-10
Payer: MEDICARE

## 2024-10-10 VITALS
OXYGEN SATURATION: 95 % | WEIGHT: 199 LBS | HEIGHT: 71 IN | DIASTOLIC BLOOD PRESSURE: 79 MMHG | BODY MASS INDEX: 27.86 KG/M2 | SYSTOLIC BLOOD PRESSURE: 169 MMHG | RESPIRATION RATE: 16 BRPM | HEART RATE: 78 BPM

## 2024-10-10 DIAGNOSIS — M54.12 CERVICAL RADICULOPATHY: ICD-10-CM

## 2024-10-10 DIAGNOSIS — R25.3 FASCICULATIONS: ICD-10-CM

## 2024-10-10 DIAGNOSIS — R29.898 BILATERAL ARM WEAKNESS: ICD-10-CM

## 2024-10-10 DIAGNOSIS — M47.812 CERVICAL SPONDYLOSIS WITHOUT MYELOPATHY: ICD-10-CM

## 2024-10-10 PROCEDURE — 99417 PROLNG OP E/M EACH 15 MIN: CPT | Performed by: PHYSICIAN ASSISTANT

## 2024-10-10 PROCEDURE — 99215 OFFICE O/P EST HI 40 MIN: CPT | Performed by: PHYSICIAN ASSISTANT

## 2024-10-10 RX ORDER — GABAPENTIN 300 MG/1
CAPSULE ORAL
COMMUNITY
Start: 2024-10-04

## 2024-10-10 RX ORDER — TAMSULOSIN HYDROCHLORIDE 0.4 MG/1
2 CAPSULE ORAL AT BEDTIME
COMMUNITY
Start: 2024-07-19

## 2024-10-10 ASSESSMENT — PAIN SCALES - GENERAL: PAINLEVEL: NO PAIN (0)

## 2024-10-10 NOTE — PROGRESS NOTES
[facility] NEUROSURGERY CLINIC [address] [address]  Phone: [phone]  Fax: [phone]      Patient:  Tl Lambert, Date of birth 1945, 79 year old, male  Referring Provider Autumn Solano MD  [address] [place] [id number]    ASSESSMENT & PLAN:  Patient has significant nondermatomal weakness and hand paresthesias that is worse on the right than left.  He is unable to have an MRI because of a cochlear ear implant, but CT myelogram does show degenerative changes in his neck that result in at least mod/sev NFS C3-7, CCS at least mod/sev C3-5, mod C5-7 with mild cord atrophy noted at C4/5 level.  EMG 9/17/24 were felt to be consistent with C5-T1 chronic neurogenic process chaim such as with multilevel cervical radic vs. motor neuron disorder such as brachial amyotrophic diplegia (unable to exclude) and DM neuropathy.     Patient's weakness and paresthesias are multi-factorial. Cervical surgery discussed with patient as an option to prevent further progression of stenosis/weakness, but would not likely restore function.  It is not clear if his weakness is coming from his neck as he does not have the classic signs of radiculopathy or myelopathy.  He may have better functional restoration with the left shoulder surgery at less risk to his QOL at this time.  He should consider his options carefully.      My recommendation at this time is for him to get the left shoulder surgery and see how he does functionally.  If he wants to pursue the cervical surgery after he recovers, he can schedule a visit with Dr. Sanchez.  (Note - imaging and case reviewed with Dr. Sanchez).      I spent 71 minutes spent in patient care, independent review and interpretation of medical records/imaging, reviewing old records.    History of Present Illness:    10/10/2024 Visit: referred for Bilateral arm weakness/ Fasciculations/ Cervical spondylosis without myelopathy/  The patient is having flu like symptoms.     I advised the patient that I would forward her symptoms to the provider and be back in touch with recommendations do to our pink dot project.     The patient expressed an understanding.    Cervical radiculopathy   PMH - L1-L4 fusion (7/2021 [place]), Cochlear implant 1992, bilateral RTC injuries, [place], DM (w/ neuropathy)    Patient notes he is here because he was getting a left shoulder workup, which requires replacement surgery.  He underwent CT myelogram (including his neck) as part of his workup.  There was a concern that the had some significant stenosis in his neck that might be causing his shoulder issues.  He saw a neurosurgeon in [place] who thought he may need to have neck surgery.  He underwent an EMG/NCS with neurology that showed C5-T1 bilateral neurogenic process.  The concern was that he may have ALS, but this was r/o by Dr. Solano.  Patient notes his hands have neuropathy worse in the right.  He started feeling like his hands were in hot water which has progressed over 1 month time period.  He has more of a constant tingling worse in the right.  He was placed on gabapentin last week Saturday (300 mg HS), which he states helped so he will be taking this more throughout the day.  He has some issues with tieing his shoe laces, but denies problems with hanging onto things.  He also endorses prickly sensation higher up in his right arm (has not occurred for a couple of months).  He occasionally has left sided neck pain/kink.  He denies neck pain today.  Movement of his head does not cause any change of sensation in his arms or neck.      Patient does note imbalance with ambulating.  He denies b/b issues.  He has been having problems with holding up a rifle or shot gun--it slowly starts to drop as he does not have the strength to keep his arms up in that position.      Left complete shoulder reversal pending per Dr. Liao (on hold pending neurosurgery evaluation).      Patient has neuropathy in both his feet for the last couple of years.  He had L1-L4 fusion in [place] in 7/2021.  He has residual cold feeling around his right knee since his surgery.      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  He now is making [email or web address].     He has a family history of ALS (paternal cousin) and MS (brother).       Conservative Treatment:  Gabapentin - helps with hand paresthesias      IMAGING   Imaging independently reviewed.    EMG 9/17/24 Dr. Solano -   Interpretation:  This is an abnormal study.  The electrophysiologic findings are most consistent with a chronic neurogenic process involving bilateral C5-T1 distributions.  These findings could be seen in multilevel cervical radiculopathies but motor neuron disorder of the cervical segment could not be entirely excluded.  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       CT MYELOGRAM CERVICAL WITH CONTRAST  Result Date: 3/28/2024  EXAM: CT MYELOGRAM CERVICAL WITH CONTRAST INDICATION: Bilateral arm weakness. Osteoarthritis of spine, cervical region. TECHNIQUE: Axial volumetric MDCT imaging was performed according to the standard protocol with intrathecal contrast.  Multiplanar reconstruction images were also generated. While obtaining CT images, dose reduction techniques were utilized including: Automated exposure control, adjustment of mA and/or kV according to patient size, and/or use of iterative reconstruction technique. CONTRAST: Please see Epic MAR for contrast dose. RADIATION DOSE: 216 DLP (mGy cm) COMPARISON: Cervical spine radiographs, 03/19/2024. CT cervical myelogram, 05/29/2018. FINDINGS: Cervical vertebral body heights preserved.  Mild convex right cervical spine curvature, apex at C5.  C6-7 and C7-T1 slight grade 1 anterolisthesis, of 1-2 mm.  Multilevel spondylotic disc space height loss is most advanced and higher grade at the 3rd and 4th disc interspaces as well as the 5th and 6th disc interspaces posteriorly.  Associated marginal osteophytes in opposing endplate degenerative attenuation changes.  C1-2 anterior articulation moderate degenerative  changes.  Facet arthropathy is most advanced and severe at C2-3, C3-4, and C4-5 on the left.  No suspicious osteolytic or osteoblastic lesion. C2-3: Disc-osteophyte complex and facet arthropathy with ligamentum flavum thickening, resulting in moderate left foraminal narrowing and mild canal narrowing. C3-4: Disc-osteophyte complex and facet arthropathy with ligamentum flavum thickening, resulting in severe right foraminal narrowing, moderate-severe left foraminal narrowing, and moderate-severe canal narrowing with lateral recess partial effacement possibly affecting the C4 nerve roots. C4-5: Disc-osteophyte complex and facet arthropathy with ligamentum flavum thickening, resulting in severe right foraminal narrowing, moderate-severe left foraminal narrowing, and moderate-severe canal narrowing with lateral recess partial effacement possibly affecting the C5 nerve roots. C5-6: Disc-osteophyte complex and facet arthropathy with ligamentum flavum thickening, resulting in moderate-severe right foraminal narrowing, moderate left foraminal narrowing, and moderate canal narrowing with right lateral recess partial effacement possibly affecting the C6 nerve roots. C6-7: Disc-osteophyte complex and facet arthropathy with ligamentum flavum thickening, resulting in moderate-severe right foraminal narrowing, moderate left foraminal narrowing, and moderate canal narrowing with lateral recess partial effacement possibly affecting the C7 nerve roots. C7-T1: Shallow disc bulge and shallow central protrusion resulting in mild canal narrowing.  No substantial foraminal narrowing. Cervical cord mild atrophy at the C4-5 level.  No discrete expansile cord signal abnormality.  Cervicomedullary junction unremarkable.  No cerebellar tonsillar ectopia. Vascular calcifications.  Lung apices without consolidation.  No discrete abnormal paraspinal soft tissue mass or fluid collection.  Vascular calcifications. IMPRESSION: 1. Advanced  spondylosis resulting in canal stenosis that is most advanced and moderate-severe at C3-4 and C4-5 with multilevel lateral recess partial effacement, as a possible etiology for pain and/or radiculopathy. 2. Multilevel higher grade foraminal stenoses, as a possible etiology for pain and/or radiculopathy.    XR PRE CT MYELOGRAM INJECTION  Result Date: 3/28/2024  EXAM: XR PRE CT MYELOGRAM INJECTION INDICATION: Aspirin,5,Cervical spine,Bilateral arm weakness,Other osteoarthritis of spine, cervical region FLUOROSCOPY TIME: 1.3 minutes RADIATION DOSE: 29.52 mGy -Air Kerma Technique PROCEDURE: Risks, benefits and alternatives were explained to the patient. Pertinent mentioned risks included, but were not limited to, infection, bleeding, allergic reaction to contrast, post-procedural headache and seizure. All of the patient's questions were answered. Both written and verbal informed consent was obtained. Time out was then performed with key personnel to confirm the correct patient, procedure and site. Utilizing fluoroscopy, the L3 level was selected and marked on the skin.  Small volume 1% lidocaine was injected for local anesthesia.  Utilizing normal sterile technique and fluoroscopic guidance, a 20 gauge Sprotte needle was advanced into the thecal sac, the inner stylet was removed, and spontaneous return of clear CSF was noted.  10 mL Omnipaque 300 was then slowly injected. Inner stylet replaced.  The needle was then removed and hemostasis was obtained.  A sterile dressing was applied. The patient was placed in the Trendelenburg position.  The contrast column was seen and progressed to the cervical spine level.  The patient was then sent to CT for further imaging. The procedure was well-tolerated by the patient and there were no immediate post-procedural complications. IMPRESSION: Successful fluoroscopy-guided myelogram.    XR CERVICAL SPINE 2/3 VIEWS  Result Date: 3/21/2024  EXAM: XR CERVICAL SPINE 3 VIEWS OR LESS  "INDICATION: Other osteoarthritis of spine, cervical region COMPARISON: Comparison is made with any relevant prior exam. FINDINGS: Moderately advanced disc degeneration at C4-T1.  Moderate facet hypertrophic change of the mid cervical spine.  Prevertebral soft tissues are normal.  No visible fracture.  Small posterior disc osteophyte complexes at C3-4, C4-5, and C5-6.  The odontoid is intact.  Atherosclerosis. IMPRESSION: Moderately advanced cervical spine degeneration.  Given fairly advanced degeneration, there is increased likelihood of neural foraminal and central canal narrowing.  Consider MRI for further assessment if clinically indicated.    Physical Exam   BP (!) 169/79 (BP Location: Right arm, Patient Position: Sitting)   Pulse 78   Resp 16   Ht 1.803 m (5' 11\")   Wt 90.3 kg (199 lb)   SpO2 95%   BMI 27.75 kg/m      Constitutional: Appears well-developed and well-nourished. Cooperative. No acute distress.   HENT:   Head: Normocephalic and atraumatic.   Eyes: Conjunctivae are normal.  Neck: Neck supple. No tracheal deviation present.  Cardiovascular: Normal rate and regular rhythm.    Pulmonary/Chest: Effort normal and breath sounds normal.  Abdominal: Exhibits no obvious distension.   Musculoskeletal: Able to move all extremities.  No involuntary motor movements. No C/T/L spine tenderness to palpation.  Muscular atrophy shoulders, arms, interosseous hands  Cervical flexion-extension range of motion: no pain w/ ROM, slightly limited extension  Skin: Skin is warm, dry and intact.   Psychiatric: Normal mood and affect. Speech is normal and behavior is normal.    Neurological:  Alert, NAD, and oriented to person, place, and time.   Facies symmetrical, left shoulder elevation compared to right  Gait: steady, symmetrical, no assistive aids    Strength (L/R)  3/5 Deltoid (L>R shoulder abduction difficulty b/c of RTC issues)  4/5 Bicep  5/5 Wrist Extensor  3/3 Tricep  3/4 Finger Flexion  3/4 Finger " Abduction  4-/3 Handgrip (right hand dom)    5/5 Hip Flexion  5/5 Knee Extension  5/5 Ankle Dorsiflexion  5/5 Extensor Hallucis Longus  5/5 Plantar Flexion    Reflexes (L/R)  1+/1+ Bicep  1+/1+ Brachioradialis  1+/1+ Patellar  --/-- Ankle (wearing boots)    No Lhermitte's  No Spurling's  No Gorge's   No ankle clonus    Sensation: SILT     Review of Systems   See HPI     No past medical history on file.    No past surgical history on file.    Social History     Socioeconomic History    Marital status:    Tobacco Use    Smoking status: Former     Types: Cigarettes    Smokeless tobacco: Never     Social Determinants of Health     Interpersonal Safety: Unknown (6/18/2024)    [facility]    Intimate Partner Violence     Are you in a relationship where you are physically hurt, threatened and/or made to feel afraid?: Unable to assess       family history is not on file.       Marii Lema PA-C  Department of Neurosurgery  Office: [phone]

## 2024-10-10 NOTE — NURSING NOTE
Chief Complaint   Patient presents with    New Patient       Patient states his BP is usually higher with the automatic machines.  At home, it is usually in the 130-140 range.  Provider advised prior to visit.    Ele Montgomery NRP

## 2024-10-10 NOTE — LETTER
10/10/2024       RE: Tl Lambert  [address]  [facility] [address]     Dear Colleague,    Thank you for referring your patient, Tl Lambert, to the [facility] NEUROSURGERY CLINIC [place] [facility]. Please see a copy of my visit note below.      [facility] NEUROSURGERY CLINIC [address]  [address] [facility] [address]  Phone: [phone]  Fax: [phone]      Patient:  Tl Lambert, Date of birth 1945, 79 year old, male  Referring Provider Autumn Solano MD  [address] [place] [id number]    ASSESSMENT & PLAN:  Patient has significant nondermatomal weakness and hand paresthesias that is worse on the right than left.  He is unable to have an MRI because of a cochlear ear implant, but CT myelogram does show degenerative changes in his neck that result in at least mod/sev NFS C3-7, CCS at least mod/sev C3-5, mod C5-7 with mild cord atrophy noted at C4/5 level.  EMG 9/17/24 were felt to be consistent with C5-T1 chronic neurogenic process chaim such as with multilevel cervical radic vs. motor neuron disorder such as brachial amyotrophic diplegia (unable to exclude) and DM neuropathy.     Patient's weakness and paresthesias are multi-factorial. Cervical surgery discussed with patient as an option to prevent further progression of stenosis/weakness, but would not likely restore function.  It is not clear if his weakness is coming from his neck as he does not have the classic signs of radiculopathy or myelopathy.  He may have better functional restoration with the left shoulder surgery at less risk to his QOL at this time.  He should consider his options carefully.      My recommendation at this time is for him to get the left shoulder surgery and see how he does functionally.  If he wants to pursue the cervical surgery after he recovers, he can schedule a visit with Dr. Sanchez.  (Note - imaging and case  reviewed with Dr. Sanchez).      I spent 71 minutes spent in patient care, independent review and interpretation of medical records/imaging, reviewing old records.    History of Present Illness:    10/10/2024 Visit: referred for Bilateral arm weakness/ Fasciculations/ Cervical spondylosis without myelopathy/ Cervical radiculopathy   PMH - L1-L4 fusion (7/2021 [place]), Cochlear implant 1992, bilateral RTC injuries, [place], DM (w/ neuropathy)    Patient notes he is here because he was getting a left shoulder workup, which requires replacement surgery.  He underwent CT myelogram (including his neck) as part of his workup.  There was a concern that the had some significant stenosis in his neck that might be causing his shoulder issues.  He saw a neurosurgeon in [place] who thought he may need to have neck surgery.  He underwent an EMG/NCS with neurology that showed C5-T1 bilateral neurogenic process.  The concern was that he may have ALS, but this was r/o by Dr. Solano.  Patient notes his hands have neuropathy worse in the right.  He started feeling like his hands were in hot water which has progressed over 1 month time period.  He has more of a constant tingling worse in the right.  He was placed on gabapentin last week Saturday (300 mg HS), which he states helped so he will be taking this more throughout the day.  He has some issues with tieing his shoe laces, but denies problems with hanging onto things.  He also endorses prickly sensation higher up in his right arm (has not occurred for a couple of months).  He occasionally has left sided neck pain/kink.  He denies neck pain today.  Movement of his head does not cause any change of sensation in his arms or neck.      Patient does note imbalance with ambulating.  He denies b/b issues.  He has been having problems with holding up a rifle or shot gun--it slowly starts to drop as he does not have the strength to keep his arms up in that position.      Left complete  shoulder reversal pending per Dr. Liao (on hold pending neurosurgery evaluation).      Patient has neuropathy in both his feet for the last couple of years.  He had L1-L4 fusion in [place] in 7/2021.  He has residual cold feeling around his right knee since his surgery.      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     He has a family history of ALS (paternal cousin) and MS (brother).       Conservative Treatment:  Gabapentin - helps with hand paresthesias      IMAGING   Imaging independently reviewed.    EMG 9/17/24 Dr. Solano -   Interpretation:  This is an abnormal study.  The electrophysiologic findings are most consistent with a chronic neurogenic process involving bilateral C5-T1 distributions.  These findings could be seen in multilevel cervical radiculopathies but motor neuron disorder of the cervical segment could not be entirely excluded.  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       CT MYELOGRAM CERVICAL WITH CONTRAST  Result Date: 3/28/2024  EXAM: CT MYELOGRAM CERVICAL WITH CONTRAST INDICATION: Bilateral arm weakness. Osteoarthritis of spine, cervical region. TECHNIQUE: Axial volumetric MDCT imaging was performed according to the standard protocol with intrathecal contrast.  Multiplanar reconstruction images were also generated. While obtaining CT images, dose reduction techniques were utilized including: Automated exposure control, adjustment of mA and/or kV according to patient size, and/or use of iterative reconstruction technique. CONTRAST: Please see Epic MAR for contrast dose. RADIATION DOSE: 216 DLP (mGy cm) COMPARISON: Cervical spine radiographs, 03/19/2024. CT cervical myelogram, 05/29/2018. FINDINGS: Cervical vertebral body heights preserved.  Mild convex right cervical spine curvature, apex at C5.  C6-7 and C7-T1 slight grade 1 anterolisthesis, of 1-2 mm.  Multilevel  spondylotic disc space height loss is most advanced and higher grade at the 3rd and 4th disc interspaces as well as the 5th and 6th disc interspaces posteriorly.  Associated marginal osteophytes in opposing endplate degenerative attenuation changes.  C1-2 anterior articulation moderate degenerative changes.  Facet arthropathy is most advanced and severe at C2-3, C3-4, and C4-5 on the left.  No suspicious osteolytic or osteoblastic lesion. C2-3: Disc-osteophyte complex and facet arthropathy with ligamentum flavum thickening, resulting in moderate left foraminal narrowing and mild canal narrowing. C3-4: Disc-osteophyte complex and facet arthropathy with ligamentum flavum thickening, resulting in severe right foraminal narrowing, moderate-severe left foraminal narrowing, and moderate-severe canal narrowing with lateral recess partial effacement possibly affecting the C4 nerve roots. C4-5: Disc-osteophyte complex and facet arthropathy with ligamentum flavum thickening, resulting in severe right foraminal narrowing, moderate-severe left foraminal narrowing, and moderate-severe canal narrowing with lateral recess partial effacement possibly affecting the C5 nerve roots. C5-6: Disc-osteophyte complex and facet arthropathy with ligamentum flavum thickening, resulting in moderate-severe right foraminal narrowing, moderate left foraminal narrowing, and moderate canal narrowing with right lateral recess partial effacement possibly affecting the C6 nerve roots. C6-7: Disc-osteophyte complex and facet arthropathy with ligamentum flavum thickening, resulting in moderate-severe right foraminal narrowing, moderate left foraminal narrowing, and moderate canal narrowing with lateral recess partial effacement possibly affecting the C7 nerve roots. C7-T1: Shallow disc bulge and shallow central protrusion resulting in mild canal narrowing.  No substantial foraminal narrowing. Cervical cord mild atrophy at the C4-5 level.  No discrete  expansile cord signal abnormality.  Cervicomedullary junction unremarkable.  No cerebellar tonsillar ectopia. Vascular calcifications.  Lung apices without consolidation.  No discrete abnormal paraspinal soft tissue mass or fluid collection.  Vascular calcifications. IMPRESSION: 1. Advanced spondylosis resulting in canal stenosis that is most advanced and moderate-severe at C3-4 and C4-5 with multilevel lateral recess partial effacement, as a possible etiology for pain and/or radiculopathy. 2. Multilevel higher grade foraminal stenoses, as a possible etiology for pain and/or radiculopathy.    XR PRE CT MYELOGRAM INJECTION  Result Date: 3/28/2024  EXAM: XR PRE CT MYELOGRAM INJECTION INDICATION: Aspirin,5,Cervical spine,Bilateral arm weakness,Other osteoarthritis of spine, cervical region FLUOROSCOPY TIME: 1.3 minutes RADIATION DOSE: 29.52 mGy -Air Kerma Technique PROCEDURE: Risks, benefits and alternatives were explained to the patient. Pertinent mentioned risks included, but were not limited to, infection, bleeding, allergic reaction to contrast, post-procedural headache and seizure. All of the patient's questions were answered. Both written and verbal informed consent was obtained. Time out was then performed with key personnel to confirm the correct patient, procedure and site. Utilizing fluoroscopy, the L3 level was selected and marked on the skin.  Small volume 1% lidocaine was injected for local anesthesia.  Utilizing normal sterile technique and fluoroscopic guidance, a 20 gauge Sprotte needle was advanced into the thecal sac, the inner stylet was removed, and spontaneous return of clear CSF was noted.  10 mL Omnipaque 300 was then slowly injected. Inner stylet replaced.  The needle was then removed and hemostasis was obtained.  A sterile dressing was applied. The patient was placed in the Trendelenburg position.  The contrast column was seen and progressed to the cervical spine level.  The patient was then  "sent to CT for further imaging. The procedure was well-tolerated by the patient and there were no immediate post-procedural complications. IMPRESSION: Successful fluoroscopy-guided myelogram.    XR CERVICAL SPINE 2/3 VIEWS  Result Date: 3/21/2024  EXAM: XR CERVICAL SPINE 3 VIEWS OR LESS INDICATION: Other osteoarthritis of spine, cervical region COMPARISON: Comparison is made with any relevant prior exam. FINDINGS: Moderately advanced disc degeneration at C4-T1.  Moderate facet hypertrophic change of the mid cervical spine.  Prevertebral soft tissues are normal.  No visible fracture.  Small posterior disc osteophyte complexes at C3-4, C4-5, and C5-6.  The odontoid is intact.  Atherosclerosis. IMPRESSION: Moderately advanced cervical spine degeneration.  Given fairly advanced degeneration, there is increased likelihood of neural foraminal and central canal narrowing.  Consider MRI for further assessment if clinically indicated.    Physical Exam   BP (!) 169/79 (BP Location: Right arm, Patient Position: Sitting)   Pulse 78   Resp 16   Ht 1.803 m (5' 11\")   Wt 90.3 kg (199 lb)   SpO2 95%   BMI 27.75 kg/m      Constitutional: Appears well-developed and well-nourished. Cooperative. No acute distress.   HENT:   Head: Normocephalic and atraumatic.   Eyes: Conjunctivae are normal.  Neck: Neck supple. No tracheal deviation present.  Cardiovascular: Normal rate and regular rhythm.    Pulmonary/Chest: Effort normal and breath sounds normal.  Abdominal: Exhibits no obvious distension.   Musculoskeletal: Able to move all extremities.  No involuntary motor movements. No C/T/L spine tenderness to palpation.  Muscular atrophy shoulders, arms, interosseous hands  Cervical flexion-extension range of motion: no pain w/ ROM, slightly limited extension  Skin: Skin is warm, dry and intact.   Psychiatric: Normal mood and affect. Speech is normal and behavior is normal.    Neurological:  Alert, NAD, and oriented to person, place, and " time.   Facies symmetrical, left shoulder elevation compared to right  Gait: steady, symmetrical, no assistive aids    Strength (L/R)  3/5 Deltoid (L>R shoulder abduction difficulty b/c of RTC issues)  4/5 Bicep  5/5 Wrist Extensor  3/3 Tricep  3/4 Finger Flexion  3/4 Finger Abduction  4-/3 Handgrip (right hand dom)    5/5 Hip Flexion  5/5 Knee Extension  5/5 Ankle Dorsiflexion  5/5 Extensor Hallucis Longus  5/5 Plantar Flexion    Reflexes (L/R)  1+/1+ Bicep  1+/1+ Brachioradialis  1+/1+ Patellar  --/-- Ankle (wearing boots)    No Lhermitte's  No Spurling's  No Gorge's   No ankle clonus    Sensation: SILT     Review of Systems   See HPI     No past medical history on file.    No past surgical history on file.    Social History     Socioeconomic History     Marital status:    Tobacco Use     Smoking status: Former     Types: Cigarettes     Smokeless tobacco: Never     Social Determinants of Health     Interpersonal Safety: Unknown (6/18/2024)    [facility]    Intimate Partner Violence      Are you in a relationship where you are physically hurt, threatened and/or made to feel afraid?: Unable to assess       family history is not on file.       Marii Lema PA-C  Department of Neurosurgery  Office: [phone]        Again, thank you for allowing me to participate in the care of your patient.      Sincerely,    Marii Lema PA-C

## 2024-10-11 NOTE — PATIENT INSTRUCTIONS
Recommend proceeding with left shoulder replacement surgery because that is less risky and will result in better function of your shoulder.      Follow up with neurosurgery if you are still interested in pursuing cervical surgery after thinking about it more.  Surgery on your neck would not likely change the function level of your arms/hands.  The risk of cervical surgery and your overall goals and quality of life should be considered when making this decision.

## 2024-10-16 ENCOUNTER — OFFICE VISIT (OUTPATIENT)
Dept: NEUROLOGY | Facility: CLINIC | Age: 79
End: 2024-10-16
Payer: MEDICARE

## 2024-10-16 VITALS
RESPIRATION RATE: 16 BRPM | DIASTOLIC BLOOD PRESSURE: 73 MMHG | SYSTOLIC BLOOD PRESSURE: 147 MMHG | HEART RATE: 68 BPM | BODY MASS INDEX: 28.83 KG/M2 | OXYGEN SATURATION: 95 % | WEIGHT: 206.7 LBS

## 2024-10-16 DIAGNOSIS — M47.812 CERVICAL SPONDYLOSIS WITHOUT MYELOPATHY: Primary | ICD-10-CM

## 2024-10-16 DIAGNOSIS — R25.3 FASCICULATIONS: ICD-10-CM

## 2024-10-16 DIAGNOSIS — E11.42 DIABETIC POLYNEUROPATHY ASSOCIATED WITH TYPE 2 DIABETES MELLITUS (H): ICD-10-CM

## 2024-10-16 DIAGNOSIS — M54.12 CERVICAL RADICULOPATHY: ICD-10-CM

## 2024-10-16 PROCEDURE — 99215 OFFICE O/P EST HI 40 MIN: CPT | Performed by: STUDENT IN AN ORGANIZED HEALTH CARE EDUCATION/TRAINING PROGRAM

## 2024-10-16 RX ORDER — GLIPIZIDE 5 MG/1
5 TABLET, FILM COATED, EXTENDED RELEASE ORAL EVERY MORNING
COMMUNITY
Start: 2024-10-12

## 2024-10-16 RX ORDER — LISINOPRIL 40 MG/1
40 TABLET ORAL EVERY MORNING
COMMUNITY
Start: 2024-08-01

## 2024-10-16 ASSESSMENT — PAIN SCALES - GENERAL: PAINLEVEL: NO PAIN (0)

## 2024-10-16 NOTE — NURSING NOTE
Chief Complaint   Patient presents with    RECHECK     BP (!) 147/73 (BP Location: Left arm, Patient Position: Sitting, Cuff Size: Adult Regular)   Pulse 68   Resp 16   Wt 93.8 kg (206 lb 11.2 oz)   SpO2 95%   BMI 28.83 kg/m      KATHLEEN MINER

## 2024-10-16 NOTE — PROGRESS NOTES
CHIEF COMPLAINT / REASON FOR VISIT  Follow-up for multilevel cervical spondylitic radiculopathy    HISTORY OF PRESENT ILLNESS  His symptoms have been overall stable in the past 4 months. He denies any new or worsening weakness. Today he reports more noticeable tingling, pins and needles sensation in bilateral arms with occasional burning pain. He was started on gabapentin 300 mg BID with significant improvement in burning pain. He continues to experience mild tingling sensation. He gets a little woozy a few hours after the morning dose of gabapentin but no side effects with the night time dose.     The following portions of the patient's history were reviewed and updated as appropriate: allergies, current medications, family history, medical history, surgical history, social history, and problem list.     PHYSICAL EXAM    NEUROLOGICAL EXAMINATION  Mental status: normal.  Gait: normal.  Walk on heels: yes, bilaterally.  Walk on toes: yes, bilaterally.    Getting up from seated position without pushing on chair: yes.  Posture: normal.  Coordination: normal.  Romberg: negative.  Speech: normal.  Extrapyramidal: normal.    Cranial nerves   R Muscle strength L  R  L   5 Frontalis 5       5 Orbicularis oculi 5  3 mm Pupils 3 mm   5 Lower facial muscles 5  nl Light reflex nl   5 Temporal-Masseter 5   Ptosis    5 Palate-Pharynx 5  nl Extraocular muscles nl   5 Sternocleidomastoid 5       5 Trapezius 5  nl Hearing nl   5 Genioglossus 5       Muscle atrophy/ enlargement: no  Fasciculations: no tongue fasciculations         R Muscle, strength L  R Muscle, strength L    Neck     Trunk    5 Neck flexors 5   Abdominal muscles    5 Neck extensors 5   Paraspinals     Upper Limbs    Lower Limbs    5 Supraspinatus 4  5 Iliopsoas 5   3 Infraspinatus 3  5 Adductors, thigh 5   4 Pectoralis 4  5 Gluteus medius 5   5 Deltoid 3  5 Gluteus max 5   4+ Biceps brachii 3  5 Quadriceps 5   4+ Brachioradialis 3  5 Hamstrings 5   5/3  Supinator/pronator 4/5  5 Tibialis anterior 5   3 Triceps 4  5 Peronei 5   5 Wrist extensor 5  4 EHL 5   5 Wrist flexors 5  5 Toe extensors 5   4 Digit extensors 5  5 Tibialis post. 5   5 Digit flexors 5  5 Toe flexors 5   4 Thenar 4  5 Calf muscles 5   5 Hypothenar 5       5 Interossei 5       Muscle atrophy / enlargement: moderate-severe muscle atrophy of the proximal upper limbs in proportion to degree of weakness  Fasciculations: Diffuse fasciculations in bilateral deltoids, triceps, biceps, and pectoralis muscles. No fasciculations in the lower limbs     R Reflexes L   1 Biceps brachii 0   0 Brachioradialis 0   1 Triceps 1   no Peralta no   1 Quadriceps 1   0 Gastroc-soleus 0   no Clonus (ankle) no   flexion Plantar response flexion        High arched palate: Absent. Elongated face: Absent. Scapular winging: Left lateral winging Pes cavus: Absent. Hammertoes: Absent. Contractures: Absent. Joint hypermobility: Absent.    Sensation  Face: normal.  Upper limbs: Mildly reduced sensation to pinprick in lateral proximal arms.   Lower limbs: Moderately reduced sensation to vibraton in the toes and mildly reduced pinprick sensation in the feet up to ankles.     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 The new sensory findings together with associating burning pain/pins and needles  sensation that improve with gabapentin would favor a cervical spondylotic radiculopathy process rather than motor neuron disorder. The lack of progression of muscle weakness is also reassuring. That being said, we will continue to observe his symptoms closely +/- repeat EMG if he were to develop additional weakness in legs or trunks.     He has met with Marii Lema in spine clinic. Cervical surgery was discussed as an option to prevent further progression of stenosis/weakness. Patient would like more time to think about this. He would like to get his left shoulder surgery done first and see how much function he can get back from shoulder surgery. I think this is very reasonable.     In the meantime, I still recommend physical therapy to maintain range of motion as well as strengthening exercise.     Follow-up in 6 months.     I spent a total of 40 minutes on the day of the visit for chart review, face-to-face visit, counseling/coordination of care, and documentation. Please see the note for further information on patient assessment and treatment.       PATIENT EDUCATION  Ready to learn, no apparent learning barriers were identified; learning preferences include listening.  Explained diagnosis and treatment plan; patient expressed understanding of the content.

## 2024-10-16 NOTE — LETTER
10/16/2024       RE: Tl Lambert  [address]  [facility] [address]     Dear Colleague,    Thank you for referring your patient, Tl Lambert, to the [facility] [place] at [facility]. Please see a copy of my visit note below.    CHIEF COMPLAINT / REASON FOR VISIT  Follow-up for multilevel cervical spondylitic radiculopathy    HISTORY OF PRESENT ILLNESS  His symptoms have been overall stable in the past 4 months. He denies any new or worsening weakness. Today he reports more noticeable tingling, pins and needles sensation in bilateral arms with occasional burning pain. He was started on gabapentin 300 mg BID with significant improvement in burning pain. He continues to experience mild tingling sensation. He gets a little woozy a few hours after the morning dose of gabapentin but no side effects with the night time dose.     The following portions of the patient's history were reviewed and updated as appropriate: allergies, current medications, family history, medical history, surgical history, social history, and problem list.     PHYSICAL EXAM    NEUROLOGICAL EXAMINATION  Mental status: normal.  Gait: normal.  Walk on heels: yes, bilaterally.  Walk on toes: yes, bilaterally.    Getting up from seated position without pushing on chair: yes.  Posture: normal.  Coordination: normal.  Romberg: negative.  Speech: normal.  Extrapyramidal: normal.    Cranial nerves   R Muscle strength L  R  L   5 Frontalis 5       5 Orbicularis oculi 5  3 mm Pupils 3 mm   5 Lower facial muscles 5  nl Light reflex nl   5 Temporal-[place] 5   Ptosis    5 Palate-Pharynx 5  nl Extraocular muscles nl   5 Sternocleidomastoid 5       5 Trapezius 5  nl Hearing nl   5 Genioglossus 5       Muscle atrophy/ enlargement: no  Fasciculations: no tongue fasciculations         R Muscle, strength L  R Muscle, strength L    Neck     Trunk    5 Neck flexors 5   Abdominal muscles    5 Neck  extensors 5   Paraspinals     Upper Limbs    Lower Limbs    5 Supraspinatus 4  5 Iliopsoas 5   3 Infraspinatus 3  5 Adductors, thigh 5   4 Pectoralis 4  5 Gluteus medius 5   5 Deltoid 3  5 Gluteus max 5   4+ Biceps brachii 3  5 Quadriceps 5   4+ Brachioradialis 3  5 Hamstrings 5   5/3 Supinator/pronator 4/5  5 Tibialis anterior 5   3 Triceps 4  5 Peronei 5   5 Wrist extensor 5  4 EHL 5   5 Wrist flexors 5  5 Toe extensors 5   4 Digit extensors 5  5 Tibialis post. 5   5 Digit flexors 5  5 Toe flexors 5   4 Thenar 4  5 Calf muscles 5   5 Hypothenar 5       5 Interossei 5       Muscle atrophy / enlargement: moderate-severe muscle atrophy of the proximal upper limbs in proportion to degree of weakness  Fasciculations: Diffuse fasciculations in bilateral deltoids, triceps, biceps, and pectoralis muscles. No fasciculations in the lower limbs     R Reflexes L   1 Biceps brachii 0   0 Brachioradialis 0   1 Triceps 1   no [place] no   1 Quadriceps 1   0 Gastroc-soleus 0   no Clonus (ankle) no   flexion Plantar response flexion        High arched palate: Absent. Elongated face: Absent. Scapular winging: Left lateral winging Pes cavus: Absent. Hammertoes: Absent. Contractures: Absent. Joint hypermobility: Absent.    Sensation  Face: normal.  Upper limbs: Mildly reduced sensation to pinprick in lateral proximal arms.   Lower limbs: Moderately reduced sensation to vibraton in the toes and mildly reduced pinprick sensation in the feet up to ankles.     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 The new sensory findings together with associating burning pain/pins and needles sensation that improve with gabapentin would favor a cervical spondylotic radiculopathy process rather than motor neuron disorder. The lack of progression of muscle weakness is also reassuring. That being said, we will continue to observe his symptoms closely +/- repeat EMG if he were to develop additional weakness in legs or trunks.     He has met with Marii Lema in spine clinic. Cervical surgery was discussed as an option to prevent further progression of stenosis/weakness. Patient would like more time to think about this. He would like to get his left shoulder surgery done first and see how much function he can get back from shoulder surgery. I think this is very reasonable.     In the meantime, I still recommend physical therapy to maintain range of motion as well as strengthening exercise.     Follow-up in 6 months.     I spent a total of 40 minutes on the day of the visit for chart review, face-to-face visit, counseling/coordination of care, and documentation. Please see the note for further information on patient assessment and treatment.       PATIENT EDUCATION  Ready to learn, no apparent learning barriers were identified; learning preferences include listening.  Explained diagnosis and treatment plan; patient expressed understanding of the content.      Again, thank you for allowing me to participate in the care of your patient.      Sincerely,    Autumn Solano MD

## 2024-12-15 ENCOUNTER — HEALTH MAINTENANCE LETTER (OUTPATIENT)
Age: 79
End: 2024-12-15

## 2025-03-16 ENCOUNTER — HEALTH MAINTENANCE LETTER (OUTPATIENT)
Age: 80
End: 2025-03-16

## 2025-04-06 ENCOUNTER — HEALTH MAINTENANCE LETTER (OUTPATIENT)
Age: 80
End: 2025-04-06

## 2025-06-29 ENCOUNTER — HEALTH MAINTENANCE LETTER (OUTPATIENT)
Age: 80
End: 2025-06-29